# Patient Record
Sex: FEMALE | Race: BLACK OR AFRICAN AMERICAN | NOT HISPANIC OR LATINO | URBAN - METROPOLITAN AREA
[De-identification: names, ages, dates, MRNs, and addresses within clinical notes are randomized per-mention and may not be internally consistent; named-entity substitution may affect disease eponyms.]

---

## 2017-07-14 ENCOUNTER — APPOINTMENT (RX ONLY)
Dept: URBAN - METROPOLITAN AREA CLINIC 348 | Facility: CLINIC | Age: 47
Setting detail: DERMATOLOGY
End: 2017-07-14

## 2017-07-14 DIAGNOSIS — L21.8 OTHER SEBORRHEIC DERMATITIS: ICD-10-CM

## 2017-07-14 DIAGNOSIS — D485 NEOPLASM OF UNCERTAIN BEHAVIOR OF SKIN: ICD-10-CM

## 2017-07-14 PROBLEM — L70.0 ACNE VULGARIS: Status: ACTIVE | Noted: 2017-07-14

## 2017-07-14 PROBLEM — L29.8 OTHER PRURITUS: Status: ACTIVE | Noted: 2017-07-14

## 2017-07-14 PROBLEM — L20.84 INTRINSIC (ALLERGIC) ECZEMA: Status: ACTIVE | Noted: 2017-07-14

## 2017-07-14 PROBLEM — D48.5 NEOPLASM OF UNCERTAIN BEHAVIOR OF SKIN: Status: ACTIVE | Noted: 2017-07-14

## 2017-07-14 PROBLEM — L85.3 XEROSIS CUTIS: Status: ACTIVE | Noted: 2017-07-14

## 2017-07-14 PROCEDURE — ? COUNSELING

## 2017-07-14 PROCEDURE — ? PRESCRIPTION

## 2017-07-14 PROCEDURE — 99202 OFFICE O/P NEW SF 15 MIN: CPT

## 2017-07-14 PROCEDURE — ? OTHER

## 2017-07-14 PROCEDURE — ? TREATMENT REGIMEN

## 2017-07-14 RX ORDER — EMOLLIENT COMBINATION NO.60
SPRAY GEL TOPICAL
Qty: 1 | Refills: 3 | Status: ERX | COMMUNITY
Start: 2017-07-14

## 2017-07-14 RX ORDER — KETOCONAZOLE 20.5 MG/ML
SHAMPOO, SUSPENSION TOPICAL
Qty: 1 | Refills: 4 | Status: ERX | COMMUNITY
Start: 2017-07-14

## 2017-07-14 RX ADMIN — KETOCONAZOLE: 20.5 SHAMPOO, SUSPENSION TOPICAL at 17:29

## 2017-07-14 RX ADMIN — Medication: at 17:34

## 2017-07-14 ASSESSMENT — LOCATION SIMPLE DESCRIPTION DERM
LOCATION SIMPLE: LEFT SCALP
LOCATION SIMPLE: LEFT RING FINGER

## 2017-07-14 ASSESSMENT — LOCATION ZONE DERM
LOCATION ZONE: FINGER
LOCATION ZONE: SCALP

## 2017-07-14 ASSESSMENT — LOCATION DETAILED DESCRIPTION DERM
LOCATION DETAILED: LEFT PROXIMAL PALMAR RING FINGER
LOCATION DETAILED: LEFT MEDIAL FRONTAL SCALP

## 2017-07-14 NOTE — PROCEDURE: OTHER
Detail Level: Simple
Other (Free Text): Plastic and hand surgery information was given at today's visit.
Note Text (......Xxx Chief Complaint.): This diagnosis correlates with the

## 2017-07-14 NOTE — PROCEDURE: TREATMENT REGIMEN
Detail Level: Zone
Initiate Treatment: Sebuderm - Apply daily to the affected areas of the scalp \\nKetoconazole shampoo - wash hair 2 to 3 times a week - let sit for 5 minutes, then rinse
Discontinue Regimen: Head and shoulders